# Patient Record
Sex: FEMALE | Race: WHITE | NOT HISPANIC OR LATINO | Employment: FULL TIME | URBAN - METROPOLITAN AREA
[De-identification: names, ages, dates, MRNs, and addresses within clinical notes are randomized per-mention and may not be internally consistent; named-entity substitution may affect disease eponyms.]

---

## 2024-05-03 ENCOUNTER — MEDICAL CORRESPONDENCE (OUTPATIENT)
Dept: HEALTH INFORMATION MANAGEMENT | Facility: CLINIC | Age: 44
End: 2024-05-03

## 2024-05-09 ENCOUNTER — TRANSCRIBE ORDERS (OUTPATIENT)
Dept: OTHER | Age: 44
End: 2024-05-09

## 2024-05-09 DIAGNOSIS — R51.9 CHRONIC NONINTRACTABLE HEADACHE, UNSPECIFIED HEADACHE TYPE: ICD-10-CM

## 2024-05-09 DIAGNOSIS — G89.29 CHRONIC NONINTRACTABLE HEADACHE, UNSPECIFIED HEADACHE TYPE: ICD-10-CM

## 2024-05-09 DIAGNOSIS — R90.89 ABNORMAL BRAIN MRI: Primary | ICD-10-CM

## 2024-05-09 DIAGNOSIS — F07.81 POST CONCUSSION SYNDROME: ICD-10-CM

## 2024-06-05 NOTE — TELEPHONE ENCOUNTER
Records Requested     June 5, 2024 10:11 AM   [id number]   Facility  [facility]   Outcome 10:14 am Sent request for imaging to be pushed to PACS. -CABRERA Haddad on 6/26/2024 at 8:21 AM Imaging resolved into PACS. -CABRERA     RECORDS RECEIVED FROM: Care Everywhere   REASON FOR VISIT: Headaches   PROVIDER: Rhona Antoine, ENE SOARES   DATE OF APPT: 7/31/24 @ 12:30 pm    NOTES (FOR ALL VISITS) STATUS DETAILS   OFFICE NOTE from referring provider Care Everywhere 4/25/24 Elizabeth Carter MD @[facility]     OFFICE NOTE from other specialist Care Everywhere 5/9/24 Flakito Joe DO  @[facility]    3/12/24, 2/27/24, 2/19/24 Hira Isabel MD  [facility] [place]    8/1/23, 5/6/22 Mattie Gomes PA-C  @[facility]     DISCHARGE SUMMARY from hospital Care Everywhere 5/26/24-5/29/24 Abelino Johnson MD  @[facility]     DISCHARGE REPORT from the ER Care Everywhere 2/8/24 Luis Alberto Flowers [facility]  [facility]     MEDICATION LIST Care Everywhere    IMAGING  (FOR ALL VISITS)     MRI (HEAD, NECK, SPINE) PACS [facility]  5/3/24 MR Brain     CT (HEAD, NECK, SPINE) PACS [facility]  12/23/22 CT Soft Tissue Neck  12/23/22 CT Head  6/12/22 CT Soft Tissue Neck

## 2024-07-30 ASSESSMENT — HEADACHE IMPACT TEST (HIT 6)
HOW OFTEN HAVE YOU FELT FED UP OR IRRITATED BECAUSE OF YOUR HEADACHES: VERY OFTEN
WHEN YOU HAVE A HEADACHE HOW OFTEN DO YOU WISH YOU COULD LIE DOWN: SOMETIMES
HOW OFTEN DID HEADACHS LIMIT CONCENTRATION ON WORK OR DAILY ACTIVITY: VERY OFTEN
HOW OFTEN DO HEADACHES LIMIT YOUR DAILY ACTIVITIES: SOMETIMES
HIT6 TOTAL SCORE: 63
WHEN YOU HAVE HEADACHES HOW OFTEN IS THE PAIN SEVERE: SOMETIMES
HOW OFTEN HAVE YOU FELT TOO TIRED TO WORK BECAUSE OF YOUR HEADACHES: VERY OFTEN

## 2024-07-30 ASSESSMENT — MIGRAINE DISABILITY ASSESSMENT (MIDAS)
HOW MANY DAYS DID YOU MISS WORK OR SCHOOL BECAUSE OF HEADACHES: 30
HOW MANY DAYS WAS YOUR PRODUCTIVITY CUT IN HALF BECAUSE OF HEADACHES: 4
HOW MANY DAYS IN THE PAST 3 MONTHS HAVE YOU HAD A HEADACHE: 92
HOW MANY DAYS DID YOU NOT DO HOUSEWORK BECAUSE OF HEADACHES: 40
TOTAL SCORE: 94
ON A SCALE FROM 0-10 ON AVERAGE HOW PAINFUL WERE HEADACHES: 5
HOW OFTEN WERE SOCIAL ACTIVITIES MISSED DUE TO HEADACHES: 5
HOW MANY DAYS WAS HOUSEWORK PRODUCTIVITY CUT IN HALF DUE TO HEADACHES: 15

## 2024-07-31 ENCOUNTER — PRE VISIT (OUTPATIENT)
Dept: NEUROLOGY | Facility: CLINIC | Age: 44
End: 2024-07-31

## 2024-07-31 ENCOUNTER — VIRTUAL VISIT (OUTPATIENT)
Dept: NEUROLOGY | Facility: CLINIC | Age: 44
End: 2024-07-31
Attending: FAMILY MEDICINE
Payer: COMMERCIAL

## 2024-07-31 DIAGNOSIS — G43.719 INTRACTABLE CHRONIC MIGRAINE WITHOUT AURA AND WITHOUT STATUS MIGRAINOSUS: Primary | ICD-10-CM

## 2024-07-31 DIAGNOSIS — R51.9 CHRONIC NONINTRACTABLE HEADACHE, UNSPECIFIED HEADACHE TYPE: ICD-10-CM

## 2024-07-31 DIAGNOSIS — F07.81 POST CONCUSSION SYNDROME: ICD-10-CM

## 2024-07-31 DIAGNOSIS — G43.009 MIGRAINE WITHOUT AURA AND WITHOUT STATUS MIGRAINOSUS, NOT INTRACTABLE: ICD-10-CM

## 2024-07-31 DIAGNOSIS — R90.89 ABNORMAL BRAIN MRI: ICD-10-CM

## 2024-07-31 DIAGNOSIS — G89.29 CHRONIC NONINTRACTABLE HEADACHE, UNSPECIFIED HEADACHE TYPE: ICD-10-CM

## 2024-07-31 PROCEDURE — 99205 OFFICE O/P NEW HI 60 MIN: CPT | Mod: 95 | Performed by: NURSE PRACTITIONER

## 2024-07-31 RX ORDER — ARIPIPRAZOLE 5 MG/1
5 TABLET ORAL DAILY
COMMUNITY
Start: 2024-05-29

## 2024-07-31 RX ORDER — POLYETHYLENE GLYCOL 3350 17 G/17G
17 POWDER, FOR SOLUTION ORAL PRN
COMMUNITY
Start: 2023-09-22

## 2024-07-31 RX ORDER — ONDANSETRON 4 MG/1
4 TABLET, ORALLY DISINTEGRATING ORAL EVERY 6 HOURS PRN
COMMUNITY

## 2024-07-31 RX ORDER — ALPRAZOLAM 0.25 MG
0.25 TABLET ORAL
COMMUNITY
Start: 2024-03-21

## 2024-07-31 RX ORDER — NARATRIPTAN 2.5 MG/1
2.5 TABLET ORAL
Qty: 12 TABLET | Refills: 11 | Status: SHIPPED | OUTPATIENT
Start: 2024-07-31 | End: 2024-09-24

## 2024-07-31 RX ORDER — TRAZODONE HYDROCHLORIDE 100 MG/1
100 TABLET ORAL AT BEDTIME
COMMUNITY
Start: 2024-04-25

## 2024-07-31 RX ORDER — CETIRIZINE HYDROCHLORIDE 10 MG/1
10 TABLET ORAL DAILY
COMMUNITY

## 2024-07-31 RX ORDER — FLUOXETINE 40 MG/1
40 CAPSULE ORAL DAILY
COMMUNITY
Start: 2024-02-02

## 2024-07-31 RX ORDER — TOPIRAMATE 100 MG/1
100 TABLET, FILM COATED ORAL DAILY
COMMUNITY
Start: 2024-05-29

## 2024-07-31 RX ORDER — EPINEPHRINE 0.3 MG/.3ML
0.3 INJECTION SUBCUTANEOUS PRN
COMMUNITY
Start: 2024-02-02

## 2024-07-31 RX ORDER — NAPROXEN 500 MG/1
500 TABLET ORAL 2 TIMES DAILY WITH MEALS
COMMUNITY
Start: 2023-08-01

## 2024-07-31 ASSESSMENT — PATIENT HEALTH QUESTIONNAIRE - PHQ9: SUM OF ALL RESPONSES TO PHQ QUESTIONS 1-9: 17

## 2024-07-31 NOTE — LETTER
7/31/2024       RE: Felicitas Muniz  [address] [place] [address]     Dear Colleague,    Thank you for referring your patient, Felicitas Muniz, to the [facility] [place] [facility]. Please see a copy of my visit note below.    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 It seem headaches got better when ozempic    Recommended to optimize headache prevention and to have rescue treatment on hand.     Acute Treatment:    -For acute treatment of moderate to severe headache, take naratriptan   at the onset of headache, with a repeat dose in fours hours if needed. Do not exceed more than 9 days per month to avoid medication overuse.  -For headache related nausea, or as a rescue medicine for headache, take ondasetron as needed.     Preventive Treatment:  -For headache prevention, continue topiramate   Recommended a trial of migraine preventive treatment with Emgality. Side effects-allergic reaction or pain in the injection side or redness in the injection side. Unknown side effects with a long term use.   Wear tinted glasses   Frequent eye breaks     Follow up in 3-4 months or sooner if needed     Took 5 weeks off and came back to work. Thinks that 10 hours and would like 8 hours for a month and not be around lights flashing while treatment is establish. Patient would  like a letter of accommodations. Will provide with a letter per patient's request    The longitudinal plan of care for Felicitas was addressed during this visit. Due to the added complexity in care, I will continue to support Felicitas in the subsequent management of this chronic headaches  and with the ongoing continuity of care of this condition(s).     All of patient's questions were answered from the best of my knowledge.  Patient is in agreement with the plan.     64 minutes spent on the date of the encounter doing video access, chart  review, results review,  meds review, treatment plan, documentation and further activities as noted above    ENE Rao, CNP [facility]                  Chief Complaint:     Headache          History is obtained from the patient and medical record.      Felicitas Muniz is a 44 year old female     Headaches since teens. Got migraines and daily headaches under control with medications -nortriptyline and topiramate 50 mg daily -Dr Moreno [facility] [id number]. May be one migraine a month.   Dr Moreno left a year ago. Patient was doing well with one-three migraine per month  Headaches were at 7/10 on the numeric pain scale. Pain would be holocephalic. Associated with photophobia and phonophobia, nausea and occasional vomiting. Intensity from 4 to 12 hours. No visual symptoms with migraines. Would take naproxen and frovatriptan-usually would work. Insurance would not approve   Daughter and son both get headaches/ migraines.     Fall in February of 2022 -headaches daily since than. Total headaches 30 days out of 30 days per month and moderate to severe 10 per month. Headaches improved somewhat since increasing topiramate dose back to 100 mg daily. Patient has been on topiramate for 9 years for chronic headaches/chronic migraine.   Pain is frontal and top and into the eyes. Pain feels squizzing and pressure and stabbing pain. Pain daily at 2/10 and slowly  increasing to 3-4/10 and varies in intensity. Sleep helps a little bit. Associated with light and noise sensitivity-wears sungless, nausea at times.   Currently -takes naproxen     Triggers of headaches lights/flashing lights after head injury, in the past menstrual period but not currently because of IUD.   Last eye exam a couple month ago -on 5/9/2024 with Dr Heath Fraga DO, [facility]     Uses CPAP nightly and able to sleep     Appetite seems fine     Was on ozempic -lost #60 and regained it back some weight back after ozempic was not available after Jan 1st.     Mood depends on the day -struggles b/c feels tired much more and struggle with energy. On the wait list to see a therapist she wants to see -[facility]. Used to see her a year ago. Crisis line numbers available.   Was hospitalized. Started on Abilify and no SI.     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 Has been in Rehab for post concussion -    Headache Tx  Eletriptan worked but changed to frova due to duration of frova  Frovatriptan  Topiramate 100 mg daily and went down to 50 mg daily in 2022  Nortriptyline 100 mg daily stopped in 2021  Propranolol was ineffective  Promethazine   Does not take any OTC analgesics because none of the works  Curently on abilify, fluoxetine, trazadone    Note from Neurology from 8/1/2023 Mattie Gomes reviewed-last neurology visit for headaches.       SH:  Revenue  at the [facility] [employer]            Past Medical History:   Past Medical History:     Depression recent hospitalization for SI-see note in Epic   FAP (familial adenomatous polyposis)   GERD (gastroesophageal reflux disease)   H/o Lyme disease 07/05/2007   Hx of migraines   Insomnia   AYLIN on CPAP 1/27/16   [facility] [employer] Dreamstation CPAP 9cm           Past Surgical History:   CHOLECYSTECTOMY 05/31/2007   COLONOSCOPY 1/18/2022     ESOPHAGOGASTRODUODENOSCOPY 08/18/2010   Endoscopy, Upper (EGD) With Biopsies    NE EGD TRANSORAL BIOPSY SINGLE/MULTIPLE 2022     NE UNLISTED CRANIOFACIAL & MAXILLOFACIAL PROCEDURE 1989   Maxillary-Facial Procedure- Broken Tooth   NE UNLISTED CRANIOFACIAL & MAXILLOFACIAL PROCEDURE 2006   Maxillary-Facial Procedure   NE UNLISTED PROCEDURE NOSE 2011   Inferior Turbinate Reduction   VAGINAL DELIVERY 2007      VAGINAL DELIVERY 2009              Social History:     Social History     Socioeconomic History    Marital status:      Spouse name: Not on file    Number of children: Not on file    Years of education: Not on file    Highest education level: Not on file   Occupational History    Not on file   Tobacco Use    Smoking status: Not on file    Smokeless tobacco: Not on file   Substance and Sexual Activity    Alcohol use: Not on file    Drug use: Not on file    Sexual activity: Not on file   Other Topics Concern    Not on file   Social History Narrative    Not on file     Social Determinants of Health     Financial Resource Strain: High Risk (2022)    [facility]    Financial Resource Strain     Difficulty of Paying Living Expenses: Not on file     Difficulty of Paying Living Expenses: Not on file   Food Insecurity: No Food Insecurity (2024)    Received from [employer]    Hunger Vital Sign     Worried About Running Out of Food in the Last Year: Never true     Ran Out of Food in the Last Year: Never true   Transportation Needs: No Transportation Needs (2024)    Received from [employer]    PRAPARE - Transportation     Lack of Transportation (Medical): No     Lack of Transportation (Non-Medical): No   Physical Activity: Not on file   Stress: Not on file   Social Connections: Unknown (2022)    Received from [employer]    Social Connections     Frequency of Communication with Friends and Family: Not on file   Interpersonal Safety: Unknown (2024)     Received from [employer]    Humiliation, Afraid, Rape, and Kick questionnaire     Fear of Current or Ex-Partner: Not on file     Emotionally Abused: Not on file     Physically Abused: No     Sexually Abused: No   Housing Stability: Unknown (5/26/2024)    [facility]    Housing Stability Vital Sign     Unable to Pay for Housing in the Last Year: No     Number of Places Lived in the Last Year: Not on file     In the last 12 months, was there a time when you did not have a steady place to sleep or slept in a shelter (including now)?: No             Family History:   No family history on file.          Allergies:      Allergies   Allergen Reactions    Ciprofloxacin Other (See Comments) and Unknown     Comment: , Description:     Comment: , Description:    Nitrofurantoin Other (See Comments) and Unknown     Comment: , Description:     Comment: , Description:    Ranitidine Hives, Itching, Other (See Comments) and Unknown     Comment: unknown, Description:     Comment: unknown, Description:    Sulfa Antibiotics     Sulfanilamide Other (See Comments) and Unknown     Comment: Unknown, Description:     Comment: Unknown, Description:    Bupropion Anxiety     Worsening anxiety             Medications:   No current outpatient medications on file.          Physical Exam:   There were no vitals taken for this visit.   Physical Exam:   General: NAD  Neurologic:   Mental Status Exam: Alert, awake and oriented to situation. No dysarthria. Speech of normal fluency.           Data:     Ophthalmology     FUNDOSCOPY - RIGHT  Vitreous: clear  Optic Nerve: normal, distinct margins, healthy rim tissue  C/D Ratio: 0.3   Macula: flat  Vessels: normal configuration and caliber  Retina: (-) holes, tears or detachments 360 degrees    FUNDOSCOPY - LEFT  Vitreous: clear  Optic Nerve: normal, distinct margins, healthy rim tissue  C/D Ratio: 0.3  Macula: flat  Vessels: normal configuration and caliber  Retina: (-) holes, tears  or detachments 360 degrees    RNFL OCT:  Right eye - normal RNFL thickness; no evidence of edema  Left eye - normal RNFL thickness; no evidence of edema    HVF 30-2:  Right eye - full field; no localizing defect  Left eye - grossly full field; no localizing defect    MRI results from 4/25/2024:    IMPRESSION:    1. No acute intracranial abnormality.  2. No evidence of intracranial mass lesion.  3. Scattered small T2/FLAIR hyperintensities within the cerebral white matter. Findings are nonspecific, though most commonly attributed to the sequela of mild chronic microvascular ischemic disease in this age group.  4. Partially empty sella, nonspecific in isolation, though can be seen associated with idiopathic intracranial hypertension in the appropriate clinical setting.        Again, thank you for allowing me to participate in the care of your patient.      Sincerely,    ENE eDlatorre CNP

## 2024-07-31 NOTE — NURSING NOTE
Current patient location:  [place]     Is the patient currently in the state of [place]? YES    Visit mode:VIDEO    If the visit is dropped, the patient can be reconnected by: TELEPHONE VISIT: Phone number:   Telephone Information:   Mobile [phone]       Will anyone else be joining the visit? NO  (If patient encounters technical issues they should call [phone])    How would you like to obtain your AVS? MyChart    Are changes needed to the allergy or medication list? Pt stated no med changes    Are refills needed on medications prescribed by this physician? NO    Rooming Documentation:  Assigned questionnaire(s) completed      Reason for visit: Video Visit (Headaches )    Annika BEATTY

## 2024-07-31 NOTE — PROGRESS NOTES
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 It seem headaches got better when ozempic    Recommended to optimize headache prevention and to have rescue treatment on hand.     Acute Treatment:    -For acute treatment of moderate to severe headache, take naratriptan   at the onset of headache, with a repeat dose in fours hours if needed. Do not exceed more than 9 days per month to avoid medication overuse.  -For headache related nausea, or as a rescue medicine for headache, take ondasetron as needed.     Preventive Treatment:  -For headache prevention, continue topiramate   Recommended a trial of migraine preventive treatment with Emgality. Side effects-allergic reaction or pain in the injection side or redness in the injection side. Unknown side effects with a long term use.   Wear tinted glasses   Frequent eye breaks     Follow up in 3-4 months or sooner if needed     Took 5 weeks off and came back to work. Thinks that 10 hours and would like 8 hours for a month and not be around lights flashing while treatment is establish. Patient would like a letter of accommodations. Will provide with a letter per patient's request    The longitudinal plan of care for Felicitas was addressed during this visit. Due to the added complexity in care, I will continue to support Felicitas in the subsequent management of this chronic headaches  and with the ongoing continuity of  care of this condition(s).     All of patient's questions were answered from the best of my knowledge.  Patient is in agreement with the plan.     64 minutes spent on the date of the encounter doing video access, chart  review, results review,  meds review, treatment plan, documentation and further activities as noted above    ENE Rao, CNP [facility]                  Chief Complaint:     Headache          History is obtained from the patient and medical record.      Felicitas Muniz is a 44 year old female     Headaches since teens. Got migraines and daily headaches under control with medications -nortriptyline and topiramate 50 mg daily -Dr Moreno [facility] [id number]. May be one migraine a month.   Dr Moreno left a year ago. Patient was doing well with one-three migraine per month  Headaches were at 7/10 on the numeric pain scale. Pain would be holocephalic. Associated with photophobia and phonophobia, nausea and occasional vomiting. Intensity from 4 to 12 hours. No visual symptoms with migraines. Would take naproxen and frovatriptan-usually would work. Insurance would not approve   Daughter and son both get headaches/ migraines.     Fall in February of 2022 -headaches daily since than. Total headaches 30 days out of 30 days per month and moderate to severe 10 per month. Headaches improved somewhat since increasing topiramate dose back to 100 mg daily. Patient has been on topiramate for 9 years for chronic headaches/chronic migraine.   Pain is frontal and top and into the eyes. Pain feels squizzing and pressure and stabbing pain. Pain daily at 2/10 and slowly increasing to 3-4/10 and varies in intensity. Sleep helps a little bit. Associated with light and noise sensitivity-wears sungless, nausea at times.   Currently -takes naproxen     Triggers of headaches lights/flashing lights after head injury, in the past menstrual period but not currently because of IUD.   Last eye  exam a couple month ago -on 5/9/2024 with Dr Heath Fraga DO, [facility]     Uses CPAP nightly and able to sleep     Appetite seems fine     Was on ozempic -lost #60 and regained it back some weight back after ozempic was not available after Jan 1st.     Mood depends on the day -struggles b/c feels tired much more and struggle with energy. On the wait list to see a therapist she wants to see -[facility]. Used to see her a year ago. Crisis line numbers available.   Was hospitalized. Started on Abilify and no SI.     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 Has been in Rehab for post concussion -    Headache Tx  Eletriptan worked but changed to frova due to duration of frova  Frovatriptan  Topiramate 100 mg daily and went down to 50 mg daily in 2022  Nortriptyline 100 mg daily stopped in 2021  Propranolol was ineffective  Promethazine   Does not take any OTC analgesics because none of the works  Curently on abilify, fluoxetine, trazadone    Note from Neurology from 8/1/2023 Mattie Gomes reviewed-last neurology visit for headaches.       SH:  Revenue  at the [facility]            Past Medical History:   Past Medical History:     Depression recent hospitalization for SI-see note in Epic   FAP (familial adenomatous polyposis)   GERD (gastroesophageal reflux disease)   H/o Lyme disease 07/05/2007   Hx of migraines   Insomnia   AYLIN on CPAP 1/27/16   [facility] [employer] Dreamstation CPAP 9cm           Past Surgical History:   CHOLECYSTECTOMY 05/31/2007   COLONOSCOPY 1/18/2022     ESOPHAGOGASTRODUODENOSCOPY 08/18/2010   Endoscopy, Upper (EGD) With Biopsies   PA EGD TRANSORAL BIOPSY SINGLE/MULTIPLE 1/18/2022     PA UNLISTED CRANIOFACIAL & MAXILLOFACIAL PROCEDURE 11/11/1989   Maxillary-Facial Procedure- Broken Tooth   PA UNLISTED CRANIOFACIAL & MAXILLOFACIAL PROCEDURE 2006   Maxillary-Facial Procedure   PA UNLISTED PROCEDURE NOSE 01/19/2011   Inferior Turbinate Reduction    VAGINAL DELIVERY 2007   [facility] [place] [facility]   VAGINAL DELIVERY 2009   [facility] [place] [facility]           Social History:     Social History     Socioeconomic History    Marital status:      Spouse name: Not on file    Number of children: Not on file    Years of education: Not on file    Highest education level: Not on file   Occupational History    Not on file   Tobacco Use    Smoking status: Not on file    Smokeless tobacco: Not on file   Substance and Sexual Activity    Alcohol use: Not on file    Drug use: Not on file    Sexual activity: Not on file   Other Topics Concern    Not on file   Social History Narrative    Not on file     Social Determinants of Health     Financial Resource Strain: High Risk (2022)    [facility]    Financial Resource Strain     Difficulty of Paying Living Expenses: Not on file     Difficulty of Paying Living Expenses: Not on file   Food Insecurity: No Food Insecurity (2024)    Received from [employer]    Hunger Vital Sign     Worried About Running Out of Food in the Last Year: Never true     Ran Out of Food in the Last Year: Never true   Transportation Needs: No Transportation Needs (2024)    Received from [employer]    PRAPARE - Transportation     Lack of Transportation (Medical): No     Lack of Transportation (Non-Medical): No   Physical Activity: Not on file   Stress: Not on file   Social Connections: Unknown (2022)    Received from [facility]    Social Connections     Frequency of Communication with Friends and Family: Not on file   Interpersonal Safety: Unknown (2024)    Received from [employer]    Humiliation, Afraid, Rape, and Kick questionnaire     Fear of Current or Ex-Partner: Not on file     Emotionally Abused: Not on file     Physically Abused: No     Sexually Abused: No   Housing Stability: Unknown (2024)    Received from [employer]    Housing Stability Vital Sign      Unable to Pay for Housing in the Last Year: No     Number of Places Lived in the Last Year: Not on file     In the last 12 months, was there a time when you did not have a steady place to sleep or slept in a shelter (including now)?: No             Family History:   No family history on file.          Allergies:      Allergies   Allergen Reactions    Ciprofloxacin Other (See Comments) and Unknown     Comment: , Description:     Comment: , Description:    Nitrofurantoin Other (See Comments) and Unknown     Comment: , Description:     Comment: , Description:    Ranitidine Hives, Itching, Other (See Comments) and Unknown     Comment: unknown, Description:     Comment: unknown, Description:    Sulfa Antibiotics     Sulfanilamide Other (See Comments) and Unknown     Comment: Unknown, Description:     Comment: Unknown, Description:    Bupropion Anxiety     Worsening anxiety             Medications:   No current outpatient medications on file.          Physical Exam:   There were no vitals taken for this visit.   Physical Exam:   General: NAD  Neurologic:   Mental Status Exam: Alert, awake and oriented to situation. No dysarthria. Speech of normal fluency.           Data:     Ophthalmology     FUNDOSCOPY - RIGHT  Vitreous: clear  Optic Nerve: normal, distinct margins, healthy rim tissue  C/D Ratio: 0.3   Macula: flat  Vessels: normal configuration and caliber  Retina: (-) holes, tears or detachments 360 degrees    FUNDOSCOPY - LEFT  Vitreous: clear  Optic Nerve: normal, distinct margins, healthy rim tissue  C/D Ratio: 0.3  Macula: flat  Vessels: normal configuration and caliber  Retina: (-) holes, tears or detachments 360 degrees    RNFL OCT:  Right eye - normal RNFL thickness; no evidence of edema  Left eye - normal RNFL thickness; no evidence of edema    HVF 30-2:  Right eye - full field; no localizing defect  Left eye - grossly full field; no localizing defect    MRI results from 4/25/2024:    IMPRESSION:    1. No  acute intracranial abnormality.  2. No evidence of intracranial mass lesion.  3. Scattered small T2/FLAIR hyperintensities within the cerebral white matter. Findings are nonspecific, though most commonly attributed to the sequela of mild chronic microvascular ischemic disease in this age group.  4. Partially empty sella, nonspecific in isolation, though can be seen associated with idiopathic intracranial hypertension in the appropriate clinical setting.

## 2024-09-03 ENCOUNTER — TELEPHONE (OUTPATIENT)
Dept: NEUROLOGY | Facility: CLINIC | Age: 44
End: 2024-09-03
Payer: COMMERCIAL

## 2024-09-03 DIAGNOSIS — G43.719 INTRACTABLE CHRONIC MIGRAINE WITHOUT AURA AND WITHOUT STATUS MIGRAINOSUS: ICD-10-CM

## 2024-09-03 NOTE — TELEPHONE ENCOUNTER
Prior Authorization Retail Medication Request    Medication/Dose: galcanezumab-gnlm (EMGALITY) 120 MG/ML injection   Diagnosis and ICD code (if different than what is on RX):    New/renewal/insurance change PA/secondary ins. PA:  Previously Tried and Failed:  See chart  Rationale:  See Chart    Insurance   Primary:   Insurance ID:      Secondary (if applicable):  Insurance ID:      Pharmacy Information (if different than what is on RX)  Name:    Phone:    Fax:

## 2024-09-04 NOTE — TELEPHONE ENCOUNTER
Central Prior Authorization Team  Phone: [phone]    PA Initiation    Medication: EMGALITY 120 MG/ML SC SOAJ (2mL dose)  Insurance Company: [employer] - Phone [phone] Fax [phone]  Pharmacy Filling the Rx: [facility] - [address]  Filling Pharmacy Phone: [phone]  Filling Pharmacy Fax: [phone]  Start Date: 9/4/2024

## 2024-09-05 ENCOUNTER — MYC MEDICAL ADVICE (OUTPATIENT)
Dept: NEUROLOGY | Facility: CLINIC | Age: 44
End: 2024-09-05
Payer: COMMERCIAL

## 2024-09-05 DIAGNOSIS — G43.719 INTRACTABLE CHRONIC MIGRAINE WITHOUT AURA AND WITHOUT STATUS MIGRAINOSUS: ICD-10-CM

## 2024-09-05 NOTE — TELEPHONE ENCOUNTER
Prior Authorization Not Needed per Insurance    Medication: EMGALITY 120 MG/ML injection is covered under patients formulary plan.  Pt has already received the initial dose of 2 pens on 7/31/24. Per pharmacy, they cannot fill the maintenance dose of 1 pen per month because they only do the 2 pens and it has to be billed for a 2 months supply. Pt's insurance will not cover 2 pens per 2 months. Plan will only cover 1 pen per 30 days for going maintenance dose. Please send a new rx to different pharmacy that can dispense 1 pen per 30 days, so plan will cover medication for patient. Thanks         Pharmacy Notified:  Yes- pharmacy can only fill 2 pens per 2 months.  Patient Notified:  No    Please close encounter when finished.    Thank you,  Central Prior Authorization Team  [phone]

## 2024-09-06 NOTE — TELEPHONE ENCOUNTER
Spoke to patient, prefers Emgality to be sent to [facility]. Order queued.

## 2024-09-24 ENCOUNTER — TELEPHONE (OUTPATIENT)
Dept: NEUROLOGY | Facility: CLINIC | Age: 44
End: 2024-09-24
Payer: COMMERCIAL

## 2024-09-24 DIAGNOSIS — G43.009 MIGRAINE WITHOUT AURA AND WITHOUT STATUS MIGRAINOSUS, NOT INTRACTABLE: Primary | ICD-10-CM

## 2024-09-24 RX ORDER — SUMATRIPTAN 50 MG/1
50-100 TABLET, FILM COATED ORAL
Qty: 12 TABLET | Refills: 11 | Status: SHIPPED | OUTPATIENT
Start: 2024-09-24

## 2024-09-24 NOTE — TELEPHONE ENCOUNTER
Prior Authorization Retail Medication Request    Medication/Dose: Galcanezumab-gnlm 120 MG/ML Inject 240 mg subcutaneous first month and then inject 120 mg subcutaneous every 28 days Diagnosis and ICD code (if different than what is on RX):    New/renewal/insurance change PA/secondary ins. PA:  Previously Tried and Failed:    Rationale:      Insurance   Primary: [employer]  Insurance ID:  [id number]     Secondary (if applicable):  Insurance ID:      Pharmacy Information (if different than what is on RX)  Name:    Phone:    Fax:

## 2024-09-26 NOTE — TELEPHONE ENCOUNTER
Prior Authorization Not Needed per Insurance    Medication:   Insurance Company: [employer] - Phone [phone] Fax [phone]  Expected CoPay:      Pharmacy Filling the Rx: [facility] [address]  Pharmacy Notified:  yes  Patient Notified:  yes- Pharmacy will contact patient when ready to /ship      Per call to plan medication has been approved through 1/20/2025.     Maintenance dosing has been approved for 1ml every 30 days.      Pharmacy was called and made aware to process as 1ml per 30 days

## 2024-10-06 ENCOUNTER — HEALTH MAINTENANCE LETTER (OUTPATIENT)
Age: 44
End: 2024-10-06

## 2025-01-24 ENCOUNTER — TELEPHONE (OUTPATIENT)
Dept: NEUROLOGY | Facility: CLINIC | Age: 45
End: 2025-01-24
Payer: COMMERCIAL

## 2025-01-24 NOTE — TELEPHONE ENCOUNTER
Prior Authorization Retail Medication Request-RENEWAL     Medication/Dose: Galcanezumab-gnlm 120 MG/ML Inject 240 mg subcutaneous first month and then inject 120 mg subcutaneous every 28 days Diagnosis and ICD code (if different than what is on RX):    New/renewal/insurance change PA/secondary ins. PA:  Previously Tried and Failed:    Rationale:       Insurance   Primary: [employer]  Insurance ID:  [id number]      Secondary (if applicable):  Insurance ID:       Pharmacy Information (if different than what is on RX)  Name:    Phone:    Fax:

## 2025-01-28 NOTE — TELEPHONE ENCOUNTER
Prior Authorization Approval    Medication: EMGALITY 120 MG/ML SC SOAJ  Authorization Effective Date: 1/28/2025  Authorization Expiration Date: 1/28/2026  Insurance Company: [employer] - Phone [phone] Fax [phone]  Which Pharmacy is filling the prescription:    Pharmacy Notified: YES  Patient Notified: YES (faxed approval letter to pharmacy and notified patient via [employer] message)

## 2025-03-26 ASSESSMENT — HEADACHE IMPACT TEST (HIT 6)
HOW OFTEN DO HEADACHES LIMIT YOUR DAILY ACTIVITIES: SOMETIMES
HOW OFTEN HAVE YOU FELT TOO TIRED TO WORK BECAUSE OF YOUR HEADACHES: RARELY
WHEN YOU HAVE A HEADACHE HOW OFTEN DO YOU WISH YOU COULD LIE DOWN: SOMETIMES
HOW OFTEN HAVE YOU FELT FED UP OR IRRITATED BECAUSE OF YOUR HEADACHES: RARELY
HOW OFTEN DID HEADACHS LIMIT CONCENTRATION ON WORK OR DAILY ACTIVITY: RARELY
HIT6 TOTAL SCORE: 55
WHEN YOU HAVE HEADACHES HOW OFTEN IS THE PAIN SEVERE: VERY OFTEN

## 2025-03-26 ASSESSMENT — MIGRAINE DISABILITY ASSESSMENT (MIDAS)
ON A SCALE FROM 0-10 ON AVERAGE HOW PAINFUL WERE HEADACHES: 5
HOW MANY DAYS WAS HOUSEWORK PRODUCTIVITY CUT IN HALF DUE TO HEADACHES: 8
HOW MANY DAYS WAS YOUR PRODUCTIVITY CUT IN HALF BECAUSE OF HEADACHES: 8
HOW OFTEN WERE SOCIAL ACTIVITIES MISSED DUE TO HEADACHES: 0
TOTAL SCORE: 16
HOW MANY DAYS IN THE PAST 3 MONTHS HAVE YOU HAD A HEADACHE: 11
HOW MANY DAYS DID YOU NOT DO HOUSEWORK BECAUSE OF HEADACHES: 0
HOW MANY DAYS DID YOU MISS WORK OR SCHOOL BECAUSE OF HEADACHES: 0

## 2025-03-27 ENCOUNTER — VIRTUAL VISIT (OUTPATIENT)
Dept: NEUROLOGY | Facility: CLINIC | Age: 45
End: 2025-03-27
Payer: COMMERCIAL

## 2025-03-27 DIAGNOSIS — G43.709 CHRONIC MIGRAINE WITHOUT AURA WITHOUT STATUS MIGRAINOSUS, NOT INTRACTABLE: ICD-10-CM

## 2025-03-27 DIAGNOSIS — G43.009 MIGRAINE WITHOUT AURA AND WITHOUT STATUS MIGRAINOSUS, NOT INTRACTABLE: Primary | ICD-10-CM

## 2025-03-27 PROCEDURE — 98006 SYNCH AUDIO-VIDEO EST MOD 30: CPT | Performed by: NURSE PRACTITIONER

## 2025-03-27 PROCEDURE — 1126F AMNT PAIN NOTED NONE PRSNT: CPT | Performed by: NURSE PRACTITIONER

## 2025-03-27 RX ORDER — GABAPENTIN 300 MG/1
600 CAPSULE ORAL AT BEDTIME
COMMUNITY
Start: 2025-01-31

## 2025-03-27 RX ORDER — RIZATRIPTAN BENZOATE 5 MG/1
5-10 TABLET, ORALLY DISINTEGRATING ORAL
Qty: 18 TABLET | Refills: 6 | Status: SHIPPED | OUTPATIENT
Start: 2025-03-27

## 2025-03-27 ASSESSMENT — PAIN SCALES - GENERAL: PAINLEVEL_OUTOF10: NO PAIN (0)

## 2025-03-27 NOTE — PROGRESS NOTES
Virtual Visit Details    Type of service:  Video Visit     Originating Location (pt. Location): Home  Distant Location (provider location):  Off-site  Platform used for Video Visit: [facility]    Headache Neurology Progress Note  March 27, 2025      Assessment/Plan:   Felicitas Muniz is a 44 year old   Chronic migraine stable with treatment   Acute Treatment:  -For acute treatment of moderate to severe headache, take ubrelvy at the onset of headache, with a repeat dose in two hours if needed.   As a back up rizatriptan as needed at severe migraine onset. May try first before trying ubrelvy. Ok to take rizatriptan the same day with ubrelvy.  Do not exceed more than 9 days per month to avoid medication overuse.  -For headache related nausea, or as a rescue medicine for headache, take ondansetron as needed.     Preventive Treatment:  -For headache prevention,   Continue Emgality and topiramate      Follow up in 6 months if stable or sooner if needed       The longitudinal plan of care for Felicitas was addressed during this visit. Due to the added complexity in care, I will continue to support Felicitas in the subsequent management of this condition(s) and with the ongoing continuity of care of this condition(s).      14 minutes spent on the date of the encounter doing video access, chart  review, results review,  meds review, treatment plan, documentation and further activities as noted above    ENE Rao, CNP On license of [facility]    Subjective:    Felicitas Muniz returns for follow up of headaches   ED visit 10/26/2023-headaches and nausea , IIH concerns.   Has been doing well since ED visit. Headaches mild daily and about 2-3 severe migraines /month.   Emgality since last visit in July 2024 and thinks it helping a lot.  Ubrelvy as needed    Headaches close to Emgality reinjection-about 2 days before Emgality injection.   Has IUD and no menstrual period. Had  menstrual period 2 weeks ago for about a week and Ubrelvy did not work with that headache but seem to work with other headaches.   Feeling better now.     Headache Tx  Topiramate 100 mg at bedtime and think it helps. No side effects. No history of kidney stones.   Naproxen did not help  Sumatriptan did not help  Naratriptan did not help  Ondasetron -helps   Ketorolac in the ED did not help   Patient is on xanax, abilify, fluoxetine  Objective:    Vitals: There were no vitals taken for this visit.  General: Cooperative, NAD  Neurologic:  Mental Status: Fully alert, attentive and oriented. Speech clear and fluent.   Cranial Nerves: Facial movements symmetric.   Motor: No abnormal movements.      Pertinent Investigations:    Questionnaire answers reviewed        7/30/2024     5:05 PM 12/3/2024    12:30 PM 3/26/2025     8:28 PM   HIT-6   When you have headaches, how often is the pain severe 10 10 11   How often do headaches limit your ability to do usual daily activities including household work, work, school, or social activities? 10 10 10   When you have a headache, how often do you wish you could lie down? 10 10 10   In the past 4 weeks, how often have you felt too tired to do work or daily activities because of your headaches 11 8 8   In the past 4 weeks, how often have you felt fed up or irritated because of your headaches 11 8 8   In the past 4 weeks, how often did headaches limit your ability to concentrate on work or daily activities 11 8 8   HIT-6 Total Score 63 54  55        Patient-reported           7/30/2024     5:23 PM 12/3/2024    12:40 PM 3/26/2025     8:33 PM   MIDAS - in the past three months:   On how many days did you miss work or school because of your headaches? 30 0 0   How many days was your productivity at work or school reduced by half or more because of your headaches? 4 12 8   On how many days did you not do household work because of your headaches? 40 4 0   How many days was your  productivity in household work reduced by half or more because of your headaches? 15 6 8   On how many days did you miss family, social, or leisure activities because of your headaches? 5 2 0   On how many days did you have a headache? 92 14 11   On a scale of 0-10, on average how painful were these headaches? 5 5 5   MIDAS Score 94 (IV - Severe Disability) 24 (IV - Severe Disability) 16 (III - Moderate Disability)

## 2025-03-27 NOTE — LETTER
3/27/2025       RE: Felicitas Muniz  [address] [place] [address]     Dear Colleague,    Thank you for referring your patient, Felicitas Muniz, to the [facility] [place] at [facility]. Please see a copy of my visit note below.    Virtual Visit Details    Type of service:  Video Visit     Originating Location (pt. Location): Home  Distant Location (provider location):  Off-site  Platform used for Video Visit: [facility]    Headache Neurology Progress Note  March 27, 2025      Assessment/Plan:   Felicitas Muniz is a 44 year old   Chronic migraine stable with treatment   Acute Treatment:  -For acute treatment of moderate to severe headache, take ubrelvy at the onset of headache, with a repeat dose in two hours if needed.   As a back up rizatriptan as needed at severe migraine onset. May try first before trying ubrelvy. Ok to take rizatriptan the same day with ubrelvy.  Do not exceed more than 9 days per month to avoid medication overuse.  -For headache related nausea, or as a rescue medicine for headache, take ondansetron as needed.     Preventive Treatment:  -For headache prevention,   Continue Emgality and topiramate      Follow up in 6 months if stable or sooner if needed       The longitudinal plan of care for Felicitas was addressed during this visit. Due to the added complexity in care, I will continue to support Felicitas in the subsequent management of this condition(s) and with the ongoing continuity of care of this condition(s).      14 minutes spent on the date of the encounter doing video access, chart  review, results review,  meds review, treatment plan, documentation and further activities as noted above    ENE Rao, CNP [facility]    Subjective:    Felicitas Muniz returns for follow up of headaches   ED visit 10/26/2023-headaches and nausea , IIH concerns.    Has been doing well since ED visit. Headaches mild daily and about 2-3 severe migraines /month.   Emgality since last visit in July 2024 and thinks it helping a lot.  Ubrelvy as needed    Headaches close to Emgality reinjection-about 2 days before Emgality injection.   Has IUD and no menstrual period. Had menstrual period 2 weeks ago for about a week and Ubrelvy did not work with that headache but seem to work with other headaches.   Feeling better now.     Headache Tx  Topiramate 100 mg at bedtime and think it helps. No side effects. No history of kidney stones.   Naproxen did not help  Sumatriptan did not help  Naratriptan did not help  Ondasetron -helps   Ketorolac in the ED did not help   Patient is on xanax, abilify, fluoxetine  Objective:    Vitals: There were no vitals taken for this visit.  General: Cooperative, NAD  Neurologic:  Mental Status: Fully alert, attentive and oriented. Speech clear and fluent.   Cranial Nerves: Facial movements symmetric.   Motor: No abnormal movements.      Pertinent Investigations:    Questionnaire answers reviewed        7/30/2024     5:05 PM 12/3/2024    12:30 PM 3/26/2025     8:28 PM   HIT-6   When you have headaches, how often is the pain severe 10 10 11   How often do headaches limit your ability to do usual daily activities including household work, work, school, or social activities? 10 10 10   When you have a headache, how often do you wish you could lie down? 10 10 10   In the past 4 weeks, how often have you felt too tired to do work or daily activities because of your headaches 11 8 8   In the past 4 weeks, how often have you felt fed up or irritated because of your headaches 11 8 8   In the past 4 weeks, how often did headaches limit your ability to concentrate on work or daily activities 11 8 8   HIT-6 Total Score 63 54  55        Patient-reported           7/30/2024     5:23 PM 12/3/2024    12:40 PM 3/26/2025     8:33 PM   MIDAS - in the past three months:   On  how many days did you miss work or school because of your headaches? 30 0 0   How many days was your productivity at work or school reduced by half or more because of your headaches? 4 12 8   On how many days did you not do household work because of your headaches? 40 4 0   How many days was your productivity in household work reduced by half or more because of your headaches? 15 6 8   On how many days did you miss family, social, or leisure activities because of your headaches? 5 2 0   On how many days did you have a headache? 92 14 11   On a scale of 0-10, on average how painful were these headaches? 5 5 5   MIDAS Score 94 (IV - Severe Disability) 24 (IV - Severe Disability) 16 (III - Moderate Disability)            Again, thank you for allowing me to participate in the care of your patient.      Sincerely,    ENE Delatorre CNP

## 2025-03-27 NOTE — NURSING NOTE
Current patient location:  in [place]    Is the patient currently in the state of [place]? YES    Visit mode: VIDEO    If the visit is dropped, the patient can be reconnected by:VIDEO VISIT: Text to cell phone:   Telephone Information:   Mobile [phone]       Will anyone else be joining the visit? NO  (If patient encounters technical issues they should call [phone] :[phone])    Are changes needed to the allergy or medication list? No    Are refills needed on medications prescribed by this physician? NO    Rooming Documentation:  Questionnaire(s) completed    Reason for visit: Follow Up    Alicja BEATTY

## 2025-03-27 NOTE — PATIENT INSTRUCTIONS
Acute Treatment:  -For acute treatment of moderate to severe headache, take ubrelvy at the onset of headache, with a repeat dose in two hours if needed.   As a back up rizatriptan as needed at severe migraine onset. May try first before trying ubrelvy. Ok to take rizatriptan the same day with ubrelvy.  Do not exceed more than 9 days per month to avoid medication overuse.  -For headache related nausea, or as a rescue medicine for headache, take ondansetron as needed.     Preventive Treatment:  -For headache prevention,   Continue Emgality and topiramate      Follow up in 6 months if stable or sooner if needed

## 2025-04-09 ENCOUNTER — TELEPHONE (OUTPATIENT)
Dept: NEUROLOGY | Facility: CLINIC | Age: 45
End: 2025-04-09
Payer: COMMERCIAL

## 2025-04-09 NOTE — TELEPHONE ENCOUNTER
Patient confirmed scheduled appointment:  Date: 9/24/25  Time: 1:30pm  Visit type: Return Headache  Provider: Rhona Antoine  Location: Virtual  Testing/imaging: NA  Additional notes: 6 month follow up    Lucila Rey on 4/9/2025 at 11:26 AM

## 2025-07-23 ENCOUNTER — TELEPHONE (OUTPATIENT)
Dept: NEUROLOGY | Facility: CLINIC | Age: 45
End: 2025-07-23
Payer: COMMERCIAL

## 2025-07-23 NOTE — TELEPHONE ENCOUNTER
VM full, Sent Mychart (1st Attempt) for the patient to call back and schedule the following:    Appointment type: Return Headache  Provider: Rhona Antoine   Return date: Sept 2025  Specialty phone number: [phone]  Additional appointment(s) needed: NA  Additonal Notes: